# Patient Record
Sex: FEMALE | Race: WHITE | Employment: FULL TIME | URBAN - NONMETROPOLITAN AREA
[De-identification: names, ages, dates, MRNs, and addresses within clinical notes are randomized per-mention and may not be internally consistent; named-entity substitution may affect disease eponyms.]

---

## 2016-02-11 LAB
BUN BLDV-MCNC: 20 MG/DL
CALCIUM SERPL-MCNC: 9.7 MG/DL
CHLORIDE BLD-SCNC: 107 MMOL/L
CHOLESTEROL, TOTAL: 186 MG/DL
CHOLESTEROL/HDL RATIO: 2.6
CO2: 29 MMOL/L
CREAT SERPL-MCNC: 0.7 MG/DL
GFR CALCULATED: NORMAL
GLUCOSE BLD-MCNC: 91 MG/DL
HDLC SERPL-MCNC: 72 MG/DL (ref 35–70)
LDL CHOLESTEROL CALCULATED: 104.4 MG/DL (ref 0–160)
POTASSIUM SERPL-SCNC: 4.4 MMOL/L
SODIUM BLD-SCNC: 146 MMOL/L
TRIGL SERPL-MCNC: 48 MG/DL
VLDLC SERPL CALC-MCNC: 10 MG/DL

## 2017-06-02 ENCOUNTER — OFFICE VISIT (OUTPATIENT)
Dept: FAMILY MEDICINE CLINIC | Age: 67
End: 2017-06-02
Payer: COMMERCIAL

## 2017-06-02 VITALS
HEIGHT: 70 IN | SYSTOLIC BLOOD PRESSURE: 124 MMHG | WEIGHT: 142 LBS | DIASTOLIC BLOOD PRESSURE: 82 MMHG | BODY MASS INDEX: 20.33 KG/M2 | HEART RATE: 84 BPM

## 2017-06-02 VITALS
HEIGHT: 69 IN | BODY MASS INDEX: 21.18 KG/M2 | SYSTOLIC BLOOD PRESSURE: 122 MMHG | WEIGHT: 143 LBS | DIASTOLIC BLOOD PRESSURE: 76 MMHG

## 2017-06-02 DIAGNOSIS — N60.19 FIBROCYSTIC BREAST DISEASE, UNSPECIFIED LATERALITY: ICD-10-CM

## 2017-06-02 DIAGNOSIS — N63.20 BREAST MASS, LEFT: ICD-10-CM

## 2017-06-02 DIAGNOSIS — R55 NEUROCARDIOGENIC SYNCOPE: ICD-10-CM

## 2017-06-02 DIAGNOSIS — Z80.3 FAMILY HISTORY OF BREAST CANCER: Primary | ICD-10-CM

## 2017-06-02 DIAGNOSIS — Z80.3 FAMILY HISTORY OF BREAST CANCER: ICD-10-CM

## 2017-06-02 PROCEDURE — 1036F TOBACCO NON-USER: CPT | Performed by: FAMILY MEDICINE

## 2017-06-02 PROCEDURE — 99213 OFFICE O/P EST LOW 20 MIN: CPT | Performed by: FAMILY MEDICINE

## 2017-06-02 PROCEDURE — G8399 PT W/DXA RESULTS DOCUMENT: HCPCS | Performed by: FAMILY MEDICINE

## 2017-06-02 PROCEDURE — 1090F PRES/ABSN URINE INCON ASSESS: CPT | Performed by: FAMILY MEDICINE

## 2017-06-02 PROCEDURE — 1123F ACP DISCUSS/DSCN MKR DOCD: CPT | Performed by: FAMILY MEDICINE

## 2017-06-02 PROCEDURE — 3014F SCREEN MAMMO DOC REV: CPT | Performed by: FAMILY MEDICINE

## 2017-06-02 PROCEDURE — G8427 DOCREV CUR MEDS BY ELIG CLIN: HCPCS | Performed by: FAMILY MEDICINE

## 2017-06-02 PROCEDURE — G8419 CALC BMI OUT NRM PARAM NOF/U: HCPCS | Performed by: FAMILY MEDICINE

## 2017-06-02 PROCEDURE — 3017F COLORECTAL CA SCREEN DOC REV: CPT | Performed by: FAMILY MEDICINE

## 2017-06-02 PROCEDURE — 4040F PNEUMOC VAC/ADMIN/RCVD: CPT | Performed by: FAMILY MEDICINE

## 2017-06-02 ASSESSMENT — PATIENT HEALTH QUESTIONNAIRE - PHQ9
2. FEELING DOWN, DEPRESSED OR HOPELESS: 0
SUM OF ALL RESPONSES TO PHQ9 QUESTIONS 1 & 2: 0
1. LITTLE INTEREST OR PLEASURE IN DOING THINGS: 0
SUM OF ALL RESPONSES TO PHQ QUESTIONS 1-9: 0

## 2017-06-02 ASSESSMENT — ENCOUNTER SYMPTOMS
SHORTNESS OF BREATH: 0
COUGH: 0

## 2017-06-06 DIAGNOSIS — N63.20 BREAST MASS, LEFT: ICD-10-CM

## 2017-10-16 ENCOUNTER — TELEPHONE (OUTPATIENT)
Dept: FAMILY MEDICINE CLINIC | Age: 67
End: 2017-10-16

## 2017-10-16 DIAGNOSIS — Z12.31 SCREENING MAMMOGRAM, ENCOUNTER FOR: Primary | ICD-10-CM

## 2017-11-22 ENCOUNTER — OFFICE VISIT (OUTPATIENT)
Dept: FAMILY MEDICINE CLINIC | Age: 67
End: 2017-11-22
Payer: COMMERCIAL

## 2017-11-22 VITALS
BODY MASS INDEX: 20.52 KG/M2 | DIASTOLIC BLOOD PRESSURE: 80 MMHG | HEART RATE: 60 BPM | WEIGHT: 143 LBS | SYSTOLIC BLOOD PRESSURE: 132 MMHG

## 2017-11-22 DIAGNOSIS — Z13.71 BRCA NEGATIVE: ICD-10-CM

## 2017-11-22 DIAGNOSIS — Z13.1 SCREENING FOR DIABETES MELLITUS: ICD-10-CM

## 2017-11-22 DIAGNOSIS — Z00.00 EXAMINATION, MEDICAL, GENERAL: Primary | ICD-10-CM

## 2017-11-22 DIAGNOSIS — Z80.3 FAMILY HISTORY OF BREAST CANCER: ICD-10-CM

## 2017-11-22 DIAGNOSIS — N60.19 FIBROCYSTIC BREAST DISEASE (FCBD), UNSPECIFIED LATERALITY: ICD-10-CM

## 2017-11-22 DIAGNOSIS — Z13.220 SCREENING, LIPID: ICD-10-CM

## 2017-11-22 DIAGNOSIS — Z11.59 NEED FOR HEPATITIS C SCREENING TEST: ICD-10-CM

## 2017-11-22 DIAGNOSIS — Z23 NEED FOR VACCINATION WITH 13-POLYVALENT PNEUMOCOCCAL CONJUGATE VACCINE: ICD-10-CM

## 2017-11-22 PROCEDURE — 99397 PER PM REEVAL EST PAT 65+ YR: CPT | Performed by: FAMILY MEDICINE

## 2017-11-22 PROCEDURE — 90471 IMMUNIZATION ADMIN: CPT | Performed by: FAMILY MEDICINE

## 2017-11-22 PROCEDURE — 90670 PCV13 VACCINE IM: CPT | Performed by: FAMILY MEDICINE

## 2017-11-22 ASSESSMENT — ENCOUNTER SYMPTOMS
SHORTNESS OF BREATH: 0
SORE THROAT: 0
BLOOD IN STOOL: 0
ABDOMINAL PAIN: 0
DIARRHEA: 0
CONSTIPATION: 0
WHEEZING: 0

## 2017-11-22 NOTE — PROGRESS NOTES
After obtaining consent, and per orders of Dr. Tonie Colon , injection of Prevnar 13 given in Left deltoid by Servando Moy. Patient instructed to remain in clinic for 20 minutes afterwards, and to report any adverse reaction to me immediately.

## 2017-11-22 NOTE — PROGRESS NOTES
Reactions    Amoxicillin     Clindamycin/Lincomycin        Health Maintenance   Topic Date Due    Hepatitis C screen  1950    Cervical cancer screen  01/08/1973    Colon cancer screen colonoscopy  01/08/2002    Zostavax vaccine  01/08/2012    Flu vaccine (1) 09/01/2017    HIV screen  11/22/2027 (Originally 1/8/1967)    Breast cancer screen  10/20/2018    Pneumococcal low/med risk (2 of 2 - PPSV23) 11/22/2018    Lipid screen  02/11/2021    DTaP/Tdap/Td vaccine (2 - Td) 11/24/2022    DEXA (modify frequency per FRAX score)  Completed       Subjective:      Review of Systems   Constitutional: Negative for appetite change, chills and fever. HENT: Negative for sore throat. Respiratory: Negative for shortness of breath and wheezing. Cardiovascular: Negative for chest pain, palpitations and leg swelling. Gastrointestinal: Negative for abdominal pain, blood in stool, constipation and diarrhea. Genitourinary: Negative for dysuria, hematuria and urgency. Hematological: Negative for adenopathy. Objective:     /80   Pulse 60   Wt 143 lb (64.9 kg)   BMI 20.52 kg/m²     Physical Exam   Constitutional: She appears well-developed and well-nourished. congested   HENT:   Head: Normocephalic. Right Ear: Tympanic membrane normal.   Left Ear: Tympanic membrane normal.   Nose: Nose normal.   Mouth/Throat: No oropharyngeal exudate or posterior oropharyngeal erythema. Neck: Neck supple. Carotid bruit is not present. No thyromegaly present. Cardiovascular: Normal rate, regular rhythm, S1 normal and S2 normal.    No murmur heard. Pulmonary/Chest: Breath sounds normal. She has no wheezes. She has no rhonchi. She has no rales. She exhibits no tenderness. Breast exam deferred ; done in June    Abdominal: Soft. There is no hepatosplenomegaly. There is no tenderness. Musculoskeletal: She exhibits no edema.         Thoracic back: Normal. She exhibits normal range of motion, no tenderness, no bony tenderness and no spasm. Lumbar back: She exhibits normal range of motion, no tenderness and no bony tenderness. Full flexion /extension of spine    Lymphadenopathy:     She has no cervical adenopathy. She has no axillary adenopathy. Neurological: No cranial nerve deficit. Vitals reviewed. Assessment:     1. Need for hepatitis C screening test     2. Family history of breast cancer     3. Fibrocystic breast disease (FCBD), unspecified laterality     4. Neurocardiogenic syncope     5. BRCA negative     6. Need for vaccination with 13-polyvalent pneumococcal conjugate vaccine  Pneumococcal conjugate vaccine 13-valent IM (PREVNAR 13)   7. Examination, medical, general  Comprehensive Metabolic Panel, Fasting    CBC Auto Differential   8. Screening, lipid  Lipid Panel   9. Screening for diabetes mellitus  Comprehensive Metabolic Panel, Fasting            POC Testing Results (If Applicable):  No results found for this visit on 11/22/17. Plan:     Reviewed last lipid profile   And ASCVD risk calculator  Repeat yearly labs; update immunizations  Reassured in regards to her pains     Orders Given:  Orders Placed This Encounter   Procedures    Pneumococcal conjugate vaccine 13-valent IM (PREVNAR 13)    Lipid Panel     Standing Status:   Future     Standing Expiration Date:   11/22/2018     Order Specific Question:   Is Patient Fasting?/# of Hours     Answer:   Yes, 12 hours    Comprehensive Metabolic Panel, Fasting     Standing Status:   Future     Standing Expiration Date:   11/22/2018    CBC Auto Differential     Standing Status:   Future     Standing Expiration Date:   11/22/2018     Prescriptions:    No orders of the defined types were placed in this encounter. Return in about 1 year (around 11/22/2018). Electronically signed by Kayla Waters MD on 11/25/2017.

## 2018-01-08 LAB
A/G RATIO: 1.5 RATIO
AGE FOR GFR: 66
ALBUMIN: 4.1 G/DL
ALK PHOSPHATASE: 84 UNITS/L
ALT SERPL-CCNC: 30 UNITS/L
ANION GAP SERPL CALCULATED.3IONS-SCNC: 13 MMOL/L
AST SERPL-CCNC: 41 UNITS/L
BASOPHILS # BLD: 0.08 THOU/MM3
BILIRUB SERPL-MCNC: 0.8 MG/DL
BUN BLDV-MCNC: 19 MG/DL
CALCIUM SERPL-MCNC: 9.5 MG/DL
CHLORIDE BLD-SCNC: 105 MMOL/L
CHOLESTEROL/HDL RATIO: 2.3 RATIO
CHOLESTEROL: 174 MG/DL
CO2: 29 MMOL/L
CREAT SERPL-MCNC: 0.7 MG/DL
DIFFERENTIAL: AUTOMATED DIFF
EGFR BF: 101 ML/MIN/1.73 M2
EGFR BM: 137 ML/MIN/1.73 M2
EGFR WF: 84 ML/MIN/1.73 M2
EGFR WM: 113 ML/MIN/1.73 M2
EOSINOPHIL # BLD: 0.4 THOU/MM3
GLOBULIN: 2.8 G/DL
GLUCOSE: 88 MG/DL
HCT VFR BLD CALC: 40.7 %
HDL, DIRECT: 75 MG/DL
HEMOGLOBIN: 13.6 G/DL
LDL CHOLESTEROL CALCULATED: 84.4 MG/DL
LYMPHOCYTES # BLD: 1.06 THOU/MM3
MCH RBC QN AUTO: 30.7 PG
MCHC RBC AUTO-ENTMCNC: 33.4 G/DL
MCV RBC AUTO: 91.9 FL
MONOCYTES # BLD: 0.41 THOU/MM3
NEUTROPHILS: 3.34 THOU/MM3
PDW BLD-RTO: 12.7 %
PLATELET # BLD: 209 THOU/MM3
PMV BLD AUTO: 8.3 FL
POTASSIUM SERPL-SCNC: 4.2 MMOL/L
RBC # BLD: 4.43 M/UL
SODIUM BLD-SCNC: 143 MMOL/L
TOTAL PROTEIN: 6.9 G/DL
TRIGL SERPL-MCNC: 73 MG/DL
VLDLC SERPL CALC-MCNC: 15 MG/DL
WBC # BLD: 5.27 THOU/ML3

## 2018-10-04 DIAGNOSIS — Z12.31 ENCOUNTER FOR SCREENING MAMMOGRAM FOR MALIGNANT NEOPLASM OF BREAST: Primary | ICD-10-CM

## 2018-11-27 ENCOUNTER — OFFICE VISIT (OUTPATIENT)
Dept: FAMILY MEDICINE CLINIC | Age: 68
End: 2018-11-27
Payer: MEDICARE

## 2018-11-27 VITALS
HEART RATE: 85 BPM | OXYGEN SATURATION: 98 % | HEIGHT: 70 IN | WEIGHT: 142 LBS | DIASTOLIC BLOOD PRESSURE: 82 MMHG | SYSTOLIC BLOOD PRESSURE: 130 MMHG | BODY MASS INDEX: 20.33 KG/M2

## 2018-11-27 DIAGNOSIS — Z80.3 FAMILY HISTORY OF BREAST CANCER: ICD-10-CM

## 2018-11-27 DIAGNOSIS — N60.19 FIBROCYSTIC BREAST DISEASE (FCBD), UNSPECIFIED LATERALITY: ICD-10-CM

## 2018-11-27 DIAGNOSIS — Z78.0 ASYMPTOMATIC POSTMENOPAUSAL STATE: ICD-10-CM

## 2018-11-27 DIAGNOSIS — Z23 NEED FOR 23-POLYVALENT PNEUMOCOCCAL POLYSACCHARIDE VACCINE: ICD-10-CM

## 2018-11-27 DIAGNOSIS — Z12.11 ENCOUNTER FOR SCREENING COLONOSCOPY: ICD-10-CM

## 2018-11-27 DIAGNOSIS — Z12.31 SCREENING MAMMOGRAM, ENCOUNTER FOR: ICD-10-CM

## 2018-11-27 DIAGNOSIS — Z23 NEED FOR PROPHYLACTIC VACCINATION AND INOCULATION AGAINST INFLUENZA: Primary | ICD-10-CM

## 2018-11-27 DIAGNOSIS — Z23 NEED FOR PROPHYLACTIC VACCINATION AGAINST STREPTOCOCCUS PNEUMONIAE (PNEUMOCOCCUS): ICD-10-CM

## 2018-11-27 DIAGNOSIS — Z11.59 NEED FOR HEPATITIS C SCREENING TEST: ICD-10-CM

## 2018-11-27 DIAGNOSIS — Z00.00 ROUTINE GENERAL MEDICAL EXAMINATION AT A HEALTH CARE FACILITY: ICD-10-CM

## 2018-11-27 LAB
HEPATITIS C IGG: NORMAL
SIGNAL/CUTOFF: NORMAL

## 2018-11-27 PROCEDURE — 4040F PNEUMOC VAC/ADMIN/RCVD: CPT | Performed by: FAMILY MEDICINE

## 2018-11-27 PROCEDURE — G0009 ADMIN PNEUMOCOCCAL VACCINE: HCPCS | Performed by: FAMILY MEDICINE

## 2018-11-27 PROCEDURE — 90732 PPSV23 VACC 2 YRS+ SUBQ/IM: CPT | Performed by: FAMILY MEDICINE

## 2018-11-27 PROCEDURE — 90662 IIV NO PRSV INCREASED AG IM: CPT | Performed by: FAMILY MEDICINE

## 2018-11-27 PROCEDURE — G0008 ADMIN INFLUENZA VIRUS VAC: HCPCS | Performed by: FAMILY MEDICINE

## 2018-11-27 PROCEDURE — G0402 INITIAL PREVENTIVE EXAM: HCPCS | Performed by: FAMILY MEDICINE

## 2018-11-27 ASSESSMENT — LIFESTYLE VARIABLES
HAS A RELATIVE, FRIEND, DOCTOR, OR ANOTHER HEALTH PROFESSIONAL EXPRESSED CONCERN ABOUT YOUR DRINKING OR SUGGESTED YOU CUT DOWN: 0
HOW OFTEN DO YOU HAVE A DRINK CONTAINING ALCOHOL: 2
AUDIT TOTAL SCORE: 2
HOW OFTEN DO YOU HAVE SIX OR MORE DRINKS ON ONE OCCASION: 0
HOW MANY STANDARD DRINKS CONTAINING ALCOHOL DO YOU HAVE ON A TYPICAL DAY: 0
AUDIT-C TOTAL SCORE: 2
HOW OFTEN DURING THE LAST YEAR HAVE YOU FAILED TO DO WHAT WAS NORMALLY EXPECTED FROM YOU BECAUSE OF DRINKING: 0
HOW OFTEN DURING THE LAST YEAR HAVE YOU NEEDED AN ALCOHOLIC DRINK FIRST THING IN THE MORNING TO GET YOURSELF GOING AFTER A NIGHT OF HEAVY DRINKING: 0
HAVE YOU OR SOMEONE ELSE BEEN INJURED AS A RESULT OF YOUR DRINKING: 0
HOW OFTEN DURING THE LAST YEAR HAVE YOU FOUND THAT YOU WERE NOT ABLE TO STOP DRINKING ONCE YOU HAD STARTED: 0
HOW OFTEN DURING THE LAST YEAR HAVE YOU HAD A FEELING OF GUILT OR REMORSE AFTER DRINKING: 0
HOW OFTEN DURING THE LAST YEAR HAVE YOU BEEN UNABLE TO REMEMBER WHAT HAPPENED THE NIGHT BEFORE BECAUSE YOU HAD BEEN DRINKING: 0

## 2018-11-27 ASSESSMENT — PATIENT HEALTH QUESTIONNAIRE - PHQ9
SUM OF ALL RESPONSES TO PHQ QUESTIONS 1-9: 0
SUM OF ALL RESPONSES TO PHQ QUESTIONS 1-9: 0

## 2018-11-27 ASSESSMENT — ANXIETY QUESTIONNAIRES: GAD7 TOTAL SCORE: 1

## 2018-11-29 ENCOUNTER — OFFICE VISIT (OUTPATIENT)
Dept: SURGERY | Age: 68
End: 2018-11-29
Payer: MEDICARE

## 2018-11-29 VITALS
TEMPERATURE: 98.9 F | BODY MASS INDEX: 20.37 KG/M2 | HEART RATE: 74 BPM | SYSTOLIC BLOOD PRESSURE: 139 MMHG | HEIGHT: 70 IN | DIASTOLIC BLOOD PRESSURE: 70 MMHG

## 2018-11-29 DIAGNOSIS — Z12.11 ENCOUNTER FOR SCREENING COLONOSCOPY: Primary | ICD-10-CM

## 2018-11-29 DIAGNOSIS — Z11.59 NEED FOR HEPATITIS C SCREENING TEST: ICD-10-CM

## 2018-11-29 LAB — ANTIBODY: NORMAL

## 2018-11-29 PROCEDURE — G8427 DOCREV CUR MEDS BY ELIG CLIN: HCPCS | Performed by: SURGERY

## 2018-11-29 PROCEDURE — 1101F PT FALLS ASSESS-DOCD LE1/YR: CPT | Performed by: SURGERY

## 2018-11-29 PROCEDURE — G8482 FLU IMMUNIZE ORDER/ADMIN: HCPCS | Performed by: SURGERY

## 2018-11-29 PROCEDURE — 99204 OFFICE O/P NEW MOD 45 MIN: CPT | Performed by: SURGERY

## 2018-11-29 PROCEDURE — 1123F ACP DISCUSS/DSCN MKR DOCD: CPT | Performed by: SURGERY

## 2018-11-29 PROCEDURE — 1090F PRES/ABSN URINE INCON ASSESS: CPT | Performed by: SURGERY

## 2018-11-29 PROCEDURE — G8420 CALC BMI NORM PARAMETERS: HCPCS | Performed by: SURGERY

## 2018-11-29 PROCEDURE — 3017F COLORECTAL CA SCREEN DOC REV: CPT | Performed by: SURGERY

## 2018-11-29 PROCEDURE — 4040F PNEUMOC VAC/ADMIN/RCVD: CPT | Performed by: SURGERY

## 2018-11-29 PROCEDURE — G8399 PT W/DXA RESULTS DOCUMENT: HCPCS | Performed by: SURGERY

## 2018-11-29 PROCEDURE — 1036F TOBACCO NON-USER: CPT | Performed by: SURGERY

## 2018-11-29 NOTE — LETTER
[address] [facility] [address]  Phone: [phone]  Fax: [phone]    Eli Stephenson MD        November 29, 2018       Patient: Makayla Jones   MR Number: [id number]   YOB: 1950   Date of Visit: 11/29/2018       Dear Dr. Kee Galdamez: Thank you for the request for consultation for Tony Balderrama to me for the evaluation of screening colonoscopy. Below are the relevant portions of my assessment and plan of care. If you have questions, please do not hesitate to call me. I look forward to following [place] along with you. Sincerely,        Delaney Pan, DO    CC providers:  Sloan Green MD  [address]

## 2018-12-03 DIAGNOSIS — Z78.0 ASYMPTOMATIC POSTMENOPAUSAL STATE: ICD-10-CM

## 2019-06-11 ENCOUNTER — OFFICE VISIT (OUTPATIENT)
Dept: FAMILY MEDICINE CLINIC | Age: 69
End: 2019-06-11
Payer: MEDICARE

## 2019-06-11 VITALS
WEIGHT: 140 LBS | OXYGEN SATURATION: 98 % | DIASTOLIC BLOOD PRESSURE: 72 MMHG | HEART RATE: 89 BPM | BODY MASS INDEX: 20.09 KG/M2 | SYSTOLIC BLOOD PRESSURE: 128 MMHG

## 2019-06-11 DIAGNOSIS — I49.9 IRREGULAR HEARTBEAT: Primary | ICD-10-CM

## 2019-06-11 DIAGNOSIS — R55 NEAR SYNCOPE: ICD-10-CM

## 2019-06-11 PROCEDURE — 1090F PRES/ABSN URINE INCON ASSESS: CPT | Performed by: FAMILY MEDICINE

## 2019-06-11 PROCEDURE — G8399 PT W/DXA RESULTS DOCUMENT: HCPCS | Performed by: FAMILY MEDICINE

## 2019-06-11 PROCEDURE — G8420 CALC BMI NORM PARAMETERS: HCPCS | Performed by: FAMILY MEDICINE

## 2019-06-11 PROCEDURE — 1036F TOBACCO NON-USER: CPT | Performed by: FAMILY MEDICINE

## 2019-06-11 PROCEDURE — 3017F COLORECTAL CA SCREEN DOC REV: CPT | Performed by: FAMILY MEDICINE

## 2019-06-11 PROCEDURE — 1123F ACP DISCUSS/DSCN MKR DOCD: CPT | Performed by: FAMILY MEDICINE

## 2019-06-11 PROCEDURE — 99214 OFFICE O/P EST MOD 30 MIN: CPT | Performed by: FAMILY MEDICINE

## 2019-06-11 PROCEDURE — 4040F PNEUMOC VAC/ADMIN/RCVD: CPT | Performed by: FAMILY MEDICINE

## 2019-06-11 PROCEDURE — G8427 DOCREV CUR MEDS BY ELIG CLIN: HCPCS | Performed by: FAMILY MEDICINE

## 2019-06-11 ASSESSMENT — PATIENT HEALTH QUESTIONNAIRE - PHQ9
2. FEELING DOWN, DEPRESSED OR HOPELESS: 0
SUM OF ALL RESPONSES TO PHQ QUESTIONS 1-9: 0
1. LITTLE INTEREST OR PLEASURE IN DOING THINGS: 0
SUM OF ALL RESPONSES TO PHQ9 QUESTIONS 1 & 2: 0
SUM OF ALL RESPONSES TO PHQ QUESTIONS 1-9: 0

## 2019-06-11 ASSESSMENT — ENCOUNTER SYMPTOMS
SHORTNESS OF BREATH: 0
COUGH: 0
WHEEZING: 0
GASTROINTESTINAL NEGATIVE: 1
CHEST TIGHTNESS: 1

## 2019-06-11 NOTE — PROGRESS NOTES
[address] [facility]  [address]  Dept: [phone]  DeptFax: [phone]    Luca Gutierrez is a79 y.o. female who presents today for her medical conditions/complaints as noted below. Luca Gutierrez is c/o of Dizziness (pt reports for past week feels like she is going to pass out, says last week was in [place] shopping and had to leave, says her shoulder hurts as well. says has some palpatations, does not pass out just has a feeling like wants to )      HPI:     HPI   Patient comes in with symptoms of ultrasound in the near syncope and palpitations. Sometimes a heaviness in her chest.  This is been going on for a few weeks. Really noticeable last week when she was visiting [place] and shopping. She had to go outside and sit down. She felt \"heavy \"like she might faint or pass out. She did not have any palpitations or fluttering her chest at that time. Chest felt heavy but she does not have any shortness of breath or dyspnea on exertion. She actually feels well when she walks. Sinking feeling palpitations and heaviness in her chest do not all occur at the same time. Sunday, 2 days prior she had the same heavy feeling in her chest when she was seated in Religious. Had to get up and move around. She wonders sometimes if this is not some anxiety. Sometimes she feels anxious. But there is no precipitating factor. Nothing like crowds or driving. Though she can have palpitations without associated symptoms. She can have heaviness in her chest without associated symptoms. She can never sinking feeling without either heaviness or palpitations. She does not have any sensation of warmth or flushing with these episodes. He also wonders about her neck. Her right neck has been painful sore again around the same time that the symptoms did. But she was outside doing some work on [employer] which included lifting and cleaning.   And her discomfort began then. She also bothered by the fact that both her mother and father suffered early coronary artery disease. Review her past medical records show that she did have a tilt table test which was positive. She knows that she goes back to 2010. She was never on any medications for neurocardiogenic syncope. Was simply told that she should increase her salt intake. She was never on Florinef. She was never on an SSRI. She does not think it those spells felt the same as this    . BP Readings from Last 3 Encounters:   06/11/19 128/72   11/29/18 139/70   11/27/18 130/82            (goal 120/80)    Past Medical History:   Diagnosis Date    BRCA negative     Fibrocystic breast disease     Neurocardiogenic syncope       Past Surgical History:   Procedure Laterality Date   [address]- right; 2006- left; 2011- left     COLONOSCOPY      COLONOSCOPY  12/14/2018    [facility]       Family History   Problem Relation Age of Onset    Coronary Art Dis Mother     Breast Cancer Mother     Coronary Art Dis Father     High Blood Pressure Father     High Cholesterol Father      Social History     Tobacco Use    Smoking status: Never Smoker    Smokeless tobacco: Never Used   Substance Use Topics    Alcohol use: Yes        Current Outpatient Medications   Medication Sig Dispense Refill    Multiple Vitamins-Minerals (MULTIVITAMIN & MINERAL PO) Take by mouth       No current facility-administered medications for this visit.       Allergies   Allergen Reactions    Amoxicillin     Clindamycin/Lincomycin        Health Maintenance   Topic Date Due    Shingles Vaccine (1 of 2) 01/08/2002    Breast cancer screen  10/22/2019    DTaP/Tdap/Td vaccine (2 - Td) 11/24/2022    Lipid screen  01/08/2023    Colon cancer screen colonoscopy  12/14/2028    DEXA (modify frequency per FRAX score)  Completed    Flu vaccine  Completed    Pneumococcal 65+ years Vaccine  Completed  Hepatitis C screen  Completed       Lab Results   Component Value Date    K 4.2 01/08/2018    CREATININE 0.7 01/08/2018    AST 41 01/08/2018    ALT 30 01/08/2018    HCT 40.7 01/08/2018    GLUCOSE 88 01/08/2018    CALCIUM 9.5 01/08/2018      Lab Results   Component Value Date    CHOL 174 01/08/2018    CHOL 186 02/11/2016    TRIG 73 01/08/2018    HDL 72 02/11/2016       Subjective:      Review of Systems   Constitutional: Negative for activity change, appetite change, fatigue, fever and unexpected weight change. HENT: Negative. Respiratory: Positive for chest tightness. Negative for cough, shortness of breath and wheezing. Cardiovascular: Positive for palpitations. Negative for chest pain and leg swelling. Gastrointestinal: Negative. Genitourinary: Negative. Musculoskeletal: Positive for neck pain and neck stiffness. Skin: Negative. Psychiatric/Behavioral: The patient is nervous/anxious. Objective:     /72 (Site: Left Upper Arm, Position: Standing)   Pulse 89   Wt 140 lb (63.5 kg)   SpO2 98%   BMI 20.09 kg/m²     Physical Exam   Constitutional: She appears well-developed and well-nourished. congested   HENT:   Head: Normocephalic. Right Ear: Tympanic membrane normal.   Left Ear: Tympanic membrane normal.   Nose: Nose normal.   Mouth/Throat: No oropharyngeal exudate or posterior oropharyngeal erythema. Neck: Neck supple. Carotid bruit is not present. No thyromegaly present. Cardiovascular: Normal rate, regular rhythm, S1 normal and S2 normal.   No murmur heard. Pulmonary/Chest: Breath sounds normal. She has no decreased breath sounds. She has no wheezes. She has no rhonchi. She has no rales. She exhibits no tenderness. Abdominal: Soft. There is no hepatosplenomegaly. There is no tenderness. Musculoskeletal: She exhibits no edema. Lymphadenopathy:     She has no cervical adenopathy. She has no axillary adenopathy. Neurological: No cranial nerve deficit. Vitals reviewed. Assessment:      Diagnosis Orders   1. Irregular heartbeat  Holter Monitor 48 Hour    Comprehensive Metabolic Panel, Fasting    CBC Auto Differential    TSH with Reflex    Magnesium   2. Near syncope  Tilt Table Test    Comprehensive Metabolic Panel, Fasting    CBC Auto Differential    TSH with Reflex    Magnesium            POC Testing Results (If Applicable):  No results found for this visit on 06/11/19. Plan:     CMP CBC TSH. Holter monitor x48 hours  Tilt table testing. Check in 2 weeks    Orders Given:  Orders Placed This Encounter   Procedures    Comprehensive Metabolic Panel, Fasting     Standing Status:   Future     Standing Expiration Date:   6/10/2020    CBC Auto Differential     Standing Status:   Future     Standing Expiration Date:   6/10/2020    TSH with Reflex     Standing Status:   Future     Standing Expiration Date:   6/10/2020    Magnesium     Standing Status:   Future     Standing Expiration Date:   6/10/2020    Holter Monitor 48 Hour     Standing Status:   Future     Standing Expiration Date:   6/11/2020     Order Specific Question:   Reason for Exam?     Answer:   Irregular heart rate    Tilt Table Test     Prescriptions:    No orders of the defined types were placed in this encounter. Return in about 2 weeks (around 6/25/2019). Electronically signed by Gen Portillo MD on6/11/2019. **This report has been created using voice recognition software. It may contain minor errors which are inherent in voice recognition technology. **

## 2019-06-14 LAB
A/G RATIO: 1.5 RATIO
AGE FOR GFR: 67
ALBUMIN: 4.1 G/DL (ref 3.5–5)
ALK PHOSPHATASE: 70 UNITS/L (ref 38–126)
ALT SERPL-CCNC: 19 UNITS/L (ref 9–52)
ANION GAP SERPL CALCULATED.3IONS-SCNC: 9 MMOL/L
AST SERPL-CCNC: 41 UNITS/L (ref 14–36)
BANDS: 0 % (ref 0–5)
BASOPHILS # BLD: 0 % (ref 0–1)
BILIRUB SERPL-MCNC: 0.6 MG/DL (ref 0.2–1.3)
BUN BLDV-MCNC: 16 MG/DL (ref 7–17)
CALCIUM SERPL-MCNC: 9.4 MG/DL (ref 8.4–10.2)
CHLORIDE BLD-SCNC: 105 MMOL/L (ref 98–120)
CO2: 30 MMOL/L (ref 22–31)
CREAT SERPL-MCNC: 0.7 MG/DL (ref 0.5–1)
DIFFERENTIAL: MANUAL DIFF
EGFR BF: 101 ML/MIN/1.73 M2
EGFR BM: 136 ML/MIN/1.73 M2
EGFR WF: 83 ML/MIN/1.73 M2
EGFR WM: 112 ML/MIN/1.73 M2
EOSINOPHIL # BLD: 13 % (ref 0–10)
GLOBULIN: 2.7 G/DL
GLUCOSE: 82 MG/DL (ref 65–105)
HCT VFR BLD CALC: 38.8 % (ref 37–47)
HEMOGLOBIN: 12.9 G/DL (ref 12–16)
LYMPHOCYTES # BLD: 25 % (ref 21–51)
MAGNESIUM: 2.1 MG/DL (ref 1.6–2.3)
MCH RBC QN AUTO: 30.3 PG (ref 28.5–32)
MCHC RBC AUTO-ENTMCNC: 33.2 G/DL (ref 32–37)
MCV RBC AUTO: 91.3 FL (ref 80–94)
MONOCYTES: 7 % (ref 2–9)
MORPHOLOGY: NORMAL
NEUTROPHILS: 55 % (ref 42–75)
PDW BLD-RTO: 12.5 % (ref 8.5–15.5)
PLATELET # BLD: 195 THOU/MM3 (ref 130–400)
PMV BLD AUTO: 8.8 FL (ref 7.4–11)
POTASSIUM SERPL-SCNC: 4.1 MMOL/L (ref 3.6–5)
RBC # BLD: 4.25 M/UL (ref 4.2–5.4)
SODIUM BLD-SCNC: 140 MMOL/L (ref 135–145)
TOTAL PROTEIN: 6.8 G/DL (ref 6.3–8.2)
TSH REFLEX FT4: 2.04 MIU/ML (ref 0.49–4.6)
WBC # BLD: 4.58 THOU/ML3 (ref 4.8–10)

## 2019-06-26 ENCOUNTER — OFFICE VISIT (OUTPATIENT)
Dept: FAMILY MEDICINE CLINIC | Age: 69
End: 2019-06-26
Payer: MEDICARE

## 2019-06-26 VITALS
HEART RATE: 86 BPM | OXYGEN SATURATION: 98 % | BODY MASS INDEX: 20.09 KG/M2 | WEIGHT: 140 LBS | DIASTOLIC BLOOD PRESSURE: 84 MMHG | SYSTOLIC BLOOD PRESSURE: 134 MMHG

## 2019-06-26 DIAGNOSIS — R55 NEUROCARDIOGENIC SYNCOPE: Primary | ICD-10-CM

## 2019-06-26 PROCEDURE — 99213 OFFICE O/P EST LOW 20 MIN: CPT | Performed by: FAMILY MEDICINE

## 2019-06-26 PROCEDURE — 3017F COLORECTAL CA SCREEN DOC REV: CPT | Performed by: FAMILY MEDICINE

## 2019-06-26 PROCEDURE — 1123F ACP DISCUSS/DSCN MKR DOCD: CPT | Performed by: FAMILY MEDICINE

## 2019-06-26 PROCEDURE — G8420 CALC BMI NORM PARAMETERS: HCPCS | Performed by: FAMILY MEDICINE

## 2019-06-26 PROCEDURE — 4040F PNEUMOC VAC/ADMIN/RCVD: CPT | Performed by: FAMILY MEDICINE

## 2019-06-26 PROCEDURE — G8399 PT W/DXA RESULTS DOCUMENT: HCPCS | Performed by: FAMILY MEDICINE

## 2019-06-26 PROCEDURE — 1090F PRES/ABSN URINE INCON ASSESS: CPT | Performed by: FAMILY MEDICINE

## 2019-06-26 PROCEDURE — G8427 DOCREV CUR MEDS BY ELIG CLIN: HCPCS | Performed by: FAMILY MEDICINE

## 2019-06-26 PROCEDURE — 1036F TOBACCO NON-USER: CPT | Performed by: FAMILY MEDICINE

## 2019-06-26 ASSESSMENT — ENCOUNTER SYMPTOMS
COUGH: 0
SORE THROAT: 0
GASTROINTESTINAL NEGATIVE: 1
SINUS PAIN: 0
SHORTNESS OF BREATH: 0
WHEEZING: 0
RHINORRHEA: 0

## 2019-06-26 NOTE — PROGRESS NOTES
[facility]  [address]  Dept: [phone]  DeptFax: [phone]    Bel Multani is a79 y.o. female who presents today for her medical conditions/complaints as noted below. Bel Multani is c/o of 2 Week Follow-Up (review results of tilt table test)      HPI:     HPI    Patient follows up after near syncopal episode. Since then she is undergone tilt table testing is noted here below. Was in fact positive. 48-hour Holter monitor is pending. Remainder of her blood work was normal.  She has been asymptomatic in the interim. Other than the fact that she has had upper airway congestion and cold for the past couple weeks. Getting better. No fevers or chills. No cough. BP Readings from Last 3 Encounters:   06/26/19 134/84   06/11/19 128/72   11/29/18 139/70            (goal 120/80)    Past Medical History:   Diagnosis Date    BRCA negative     Fibrocystic breast disease     Neurocardiogenic syncope       Past Surgical History:   Procedure Laterality Date   [address]- right; 2006- left; 2011- left     CATARACT REMOVAL Bilateral     COLONOSCOPY      COLONOSCOPY  12/14/2018    [facility] Chey Zurita at [facility] History   Problem Relation Age of Onset    Coronary Art Dis Mother     Breast Cancer Mother     Coronary Art Dis Father     High Blood Pressure Father     High Cholesterol Father      Social History     Tobacco Use    Smoking status: Never Smoker    Smokeless tobacco: Never Used   Substance Use Topics    Alcohol use: Yes        Current Outpatient Medications   Medication Sig Dispense Refill    Multiple Vitamins-Minerals (MULTIVITAMIN & MINERAL PO) Take by mouth      Calcium Carbonate-Vitamin D (CALCIUM 500 + D) 500-125 MG-UNIT TABS Take 1 tablet by mouth daily       No current facility-administered medications for this visit.       Allergies   Allergen Reactions    Amoxicillin  Clindamycin/Lincomycin        Health Maintenance   Topic Date Due    Shingles Vaccine (1 of 2) 01/08/2002    Breast cancer screen  10/22/2019    Annual Wellness Visit (AWV)  11/27/2019    DTaP/Tdap/Td vaccine (2 - Td) 11/24/2022    Lipid screen  01/08/2023    Colon cancer screen colonoscopy  12/14/2028    DEXA (modify frequency per FRAX score)  Completed    Flu vaccine  Completed    Pneumococcal 65+ years Vaccine  Completed    Hepatitis C screen  Completed       Lab Results   Component Value Date    K 4.1 06/14/2019    CREATININE 0.7 06/14/2019    AST 41 06/14/2019    ALT 19 06/14/2019    HCT 38.8 06/14/2019    GLUCOSE 82 06/14/2019    MG 2.1 06/14/2019    CALCIUM 9.4 06/14/2019      Lab Results   Component Value Date    CHOL 174 01/08/2018    CHOL 186 02/11/2016    TRIG 73 01/08/2018    HDL 72 02/11/2016       Subjective:      Review of Systems   Constitutional: Negative for appetite change, chills, fatigue and fever. HENT: Positive for congestion. Negative for ear pain, rhinorrhea, sinus pain and sore throat. Respiratory: Negative for cough, shortness of breath and wheezing. Cardiovascular: Negative for chest pain, palpitations and leg swelling. Gastrointestinal: Negative. Musculoskeletal: Negative. Neurological: Negative for dizziness, tremors, syncope, light-headedness, numbness and headaches. Psychiatric/Behavioral: Negative. Objective:     /84 (Site: Left Upper Arm, Position: Sitting, Cuff Size: Small Adult)   Pulse 86   Wt 140 lb (63.5 kg)   SpO2 98%   BMI 20.09 kg/m²     Physical Exam   Constitutional: She appears well-developed and well-nourished. congested   HENT:   Head: Normocephalic. Right Ear: Tympanic membrane normal.   Left Ear: Tympanic membrane normal.   Nose: Nose normal.   Mouth/Throat: No oropharyngeal exudate or posterior oropharyngeal erythema. Neck: Neck supple. Carotid bruit is not present. No thyromegaly present.    Cardiovascular:

## 2019-06-26 NOTE — PATIENT INSTRUCTIONS
Patient Education        Vasovagal Syncope: Care Instructions  Your Care Instructions    Vasovagal syncope (say \"mcs-gwi-FSVKylie HILLMAN-kuh-pee\")is sudden dizziness or fainting that can be set off by things such as pain, stress, fear, or trauma. You may sweat or feel lightheaded, sick to your stomach, or tingly. The problem causes the heart rate to slow and the blood vessels to widen, or dilate, for a short time. When this happens, blood pools in the lower body, and less blood goes to the brain. You can usually get relief by lying down with your legs raised (elevated). This helps more blood to flow to your brain and may help relieve symptoms like feeling dizzy. Some doctors may recommend a technique that involves tensing your fists and arms. This type of fainting is often easy to predict. For example, it happens to some people when they see blood or have to get a shot. They may feel symptoms before they faint. An episode of vasovagal syncope usually responds well to self-care. Other treatment often isn't needed. But if the fainting keeps happening, your doctor may suggest further treatments. Follow-up care is a key part of your treatment and safety. Be sure to make and go to all appointments, and call your doctor if you are having problems. It's also a good idea to know your test results and keep a list of the medicines you take. How can you care for yourself at home? · Drink plenty of fluids to prevent dehydration. If you have kidney, heart, or liver disease and have to limit fluids, talk with your doctor before you increase your fluid intake. · Try to avoid things that you think may set off vasovagal syncope. · Talk to your doctor about any medicines you take. Some medicines may increase the chance of this condition occurring. · If you feel symptoms, lie down with your legs raised. Talk to your doctor about what to do if your symptoms come back. When should you call for help?   Call 911 anytime you think you may need emergency care. For example, call if:    · You have symptoms of a heart problem. These may include:  ? Chest pain or pressure. ? Severe trouble breathing. ? A fast or irregular heartbeat.    Watch closely for changes in your health, and be sure to contact your doctor if:    · You have more episodes of fainting at home.     · You do not get better as expected. Where can you learn more? Go to [email or web address] and sign in to your [employer] account. Enter L754 in the [employer] box to learn more about \"Vasovagal Syncope: Care Instructions. \"     If you do not have an account, please click on the \"Sign Up Now\" link. Current as of: September 23, 2018  Content Version: 12.0  © 8832-7402 Healthwise, Incorporated. Care instructions adapted under license by [facility] ([facility]). If you have questions about a medical condition or this instruction, always ask your healthcare professional. [address] any warranty or liability for your use of this information.

## 2019-06-27 DIAGNOSIS — I49.9 IRREGULAR HEARTBEAT: ICD-10-CM

## 2019-10-07 ENCOUNTER — TELEPHONE (OUTPATIENT)
Dept: FAMILY MEDICINE CLINIC | Age: 69
End: 2019-10-07

## 2019-12-03 ENCOUNTER — OFFICE VISIT (OUTPATIENT)
Dept: FAMILY MEDICINE CLINIC | Age: 69
End: 2019-12-03
Payer: MEDICARE

## 2019-12-03 VITALS
DIASTOLIC BLOOD PRESSURE: 84 MMHG | WEIGHT: 143 LBS | OXYGEN SATURATION: 98 % | HEIGHT: 70 IN | SYSTOLIC BLOOD PRESSURE: 130 MMHG | HEART RATE: 91 BPM | BODY MASS INDEX: 20.47 KG/M2

## 2019-12-03 DIAGNOSIS — Z12.31 ENCOUNTER FOR SCREENING MAMMOGRAM FOR BREAST CANCER: ICD-10-CM

## 2019-12-03 DIAGNOSIS — Z23 NEED FOR PROPHYLACTIC VACCINATION AND INOCULATION AGAINST VARICELLA: ICD-10-CM

## 2019-12-03 DIAGNOSIS — N63.0 BREAST LUMP IN FEMALE: ICD-10-CM

## 2019-12-03 DIAGNOSIS — Z00.00 ROUTINE GENERAL MEDICAL EXAMINATION AT A HEALTH CARE FACILITY: Primary | ICD-10-CM

## 2019-12-03 DIAGNOSIS — R55 NEUROCARDIOGENIC SYNCOPE: ICD-10-CM

## 2019-12-03 DIAGNOSIS — Z80.3 FAMILY HISTORY OF BREAST CANCER: ICD-10-CM

## 2019-12-03 PROCEDURE — 3017F COLORECTAL CA SCREEN DOC REV: CPT | Performed by: FAMILY MEDICINE

## 2019-12-03 PROCEDURE — G8482 FLU IMMUNIZE ORDER/ADMIN: HCPCS | Performed by: FAMILY MEDICINE

## 2019-12-03 PROCEDURE — G0008 ADMIN INFLUENZA VIRUS VAC: HCPCS | Performed by: FAMILY MEDICINE

## 2019-12-03 PROCEDURE — 1036F TOBACCO NON-USER: CPT | Performed by: FAMILY MEDICINE

## 2019-12-03 PROCEDURE — G8420 CALC BMI NORM PARAMETERS: HCPCS | Performed by: FAMILY MEDICINE

## 2019-12-03 PROCEDURE — 90653 IIV ADJUVANT VACCINE IM: CPT | Performed by: FAMILY MEDICINE

## 2019-12-03 PROCEDURE — G0438 PPPS, INITIAL VISIT: HCPCS | Performed by: FAMILY MEDICINE

## 2019-12-03 PROCEDURE — G8427 DOCREV CUR MEDS BY ELIG CLIN: HCPCS | Performed by: FAMILY MEDICINE

## 2019-12-03 PROCEDURE — 1090F PRES/ABSN URINE INCON ASSESS: CPT | Performed by: FAMILY MEDICINE

## 2019-12-03 PROCEDURE — 4040F PNEUMOC VAC/ADMIN/RCVD: CPT | Performed by: FAMILY MEDICINE

## 2019-12-03 PROCEDURE — G8399 PT W/DXA RESULTS DOCUMENT: HCPCS | Performed by: FAMILY MEDICINE

## 2019-12-03 PROCEDURE — 99212 OFFICE O/P EST SF 10 MIN: CPT | Performed by: FAMILY MEDICINE

## 2019-12-03 PROCEDURE — 1123F ACP DISCUSS/DSCN MKR DOCD: CPT | Performed by: FAMILY MEDICINE

## 2019-12-03 ASSESSMENT — ENCOUNTER SYMPTOMS
RHINORRHEA: 0
SORE THROAT: 0
SINUS PAIN: 0
WHEEZING: 0
COUGH: 0
SHORTNESS OF BREATH: 0
GASTROINTESTINAL NEGATIVE: 1

## 2019-12-03 ASSESSMENT — PATIENT HEALTH QUESTIONNAIRE - PHQ9
SUM OF ALL RESPONSES TO PHQ QUESTIONS 1-9: 0
SUM OF ALL RESPONSES TO PHQ QUESTIONS 1-9: 0

## 2019-12-03 ASSESSMENT — LIFESTYLE VARIABLES
HOW MANY STANDARD DRINKS CONTAINING ALCOHOL DO YOU HAVE ON A TYPICAL DAY: 0
HOW OFTEN DO YOU HAVE SIX OR MORE DRINKS ON ONE OCCASION: 0
HOW OFTEN DO YOU HAVE A DRINK CONTAINING ALCOHOL: 2
AUDIT-C TOTAL SCORE: 2

## 2020-06-08 ENCOUNTER — OFFICE VISIT (OUTPATIENT)
Dept: FAMILY MEDICINE CLINIC | Age: 70
End: 2020-06-08
Payer: MEDICARE

## 2020-06-08 VITALS
DIASTOLIC BLOOD PRESSURE: 80 MMHG | WEIGHT: 139 LBS | OXYGEN SATURATION: 98 % | BODY MASS INDEX: 19.94 KG/M2 | HEART RATE: 80 BPM | SYSTOLIC BLOOD PRESSURE: 110 MMHG

## 2020-06-08 PROBLEM — R92.8 OTHER ABNORMAL AND INCONCLUSIVE FINDINGS ON DIAGNOSTIC IMAGING OF BREAST: Status: ACTIVE | Noted: 2020-06-08

## 2020-06-08 PROCEDURE — 99213 OFFICE O/P EST LOW 20 MIN: CPT | Performed by: NURSE PRACTITIONER

## 2020-06-08 PROCEDURE — 99213 OFFICE O/P EST LOW 20 MIN: CPT

## 2020-06-08 ASSESSMENT — ENCOUNTER SYMPTOMS
COUGH: 0
SHORTNESS OF BREATH: 0
ABDOMINAL PAIN: 0
WHEEZING: 0

## 2020-06-08 ASSESSMENT — PATIENT HEALTH QUESTIONNAIRE - PHQ9
1. LITTLE INTEREST OR PLEASURE IN DOING THINGS: 0
SUM OF ALL RESPONSES TO PHQ QUESTIONS 1-9: 0
SUM OF ALL RESPONSES TO PHQ QUESTIONS 1-9: 0
SUM OF ALL RESPONSES TO PHQ9 QUESTIONS 1 & 2: 0
2. FEELING DOWN, DEPRESSED OR HOPELESS: 0

## 2020-06-08 NOTE — PROGRESS NOTES
[address]  [address]  Dept: [phone]  Dept Fax: [phone]  Loc: [phone]     Visit Date:  6/8/2020    Patient:  Kellee Castro  YOB: 1950    HPI:     Chief Complaint   Patient presents with    Follow-up     here to have left breast rechecked had a small lump and was advised to have rechecked in 6 months family hx of breast cancer       HPI  3-4 yrs noticed area of lumpy tissue on left outer breast, she has had MRI and Mamms  Which we review together. No change in SBE or breast appearance, no nipple discharge or skin changes, no history of TOB ABUSE    Medications  Prior to Visit Medications    Medication Sig Taking? Authorizing Provider   FIBER ADULT GUMMIES PO Take by mouth Yes Historical Provider, MD   Calcium Carbonate-Vitamin D (CALCIUM 500 + D) 500-125 MG-UNIT TABS Take 1 tablet by mouth daily Yes Historical Provider, MD   Multiple Vitamins-Minerals (MULTIVITAMIN & MINERAL PO) Take by mouth Yes Historical Provider, MD        The patient is allergic to amoxicillin and clindamycin/lincomycin. Past Medical History  Jimmie Arias  has a past medical history of BRCA negative, Family history of breast cancer, Fibrocystic breast disease, and Neurocardiogenic syncope. Past Surgical History  The patient  has a past surgical history that includes Colonoscopy; eye surgery; Breast surgery; Colonoscopy (12/14/2018); and Cataract removal (Bilateral). Family History  This patient's family history includes Breast Cancer in her mother; Coronary Art Dis in her father and mother; High Blood Pressure in her father; High Cholesterol in her father. Social History  Jimmie Arias  reports that she has never smoked. She has never used smokeless tobacco. She reports current alcohol use. She reports that she does not use drugs.     Health Maintenance:    Health Maintenance   Topic Date Due    06/14/2019    CHOL 174 01/08/2018    TRIG 73 01/08/2018    HDL 72 (A) 02/11/2016    ALT 19 06/14/2019    AST 41 (H) 06/14/2019     06/14/2019    K 4.1 06/14/2019     06/14/2019    CREATININE 0.7 06/14/2019    BUN 16 06/14/2019    CO2 30 06/14/2019       Assessment/Plan      1. Fibrocystic breast disease (FCBD), unspecified laterality  Reviewed MRI of 2017  Reported neg for BRCA testing and no change in breast tissue    Continues SBE, and some worries over prominence of breast cancer in family   - MRI BREAST BILATERAL WO CONTRAST; Future    2. Other abnormal and inconclusive findings on diagnostic imaging of breast   3. Vaginal Dryness:   Referral to [facility]ist now for diagnosis and treatment options , pt not prepared for vaginal exam today    - MRI BREAST BILATERAL WO CONTRAST; Future      No follow-ups on file. Patient given educational materials - see patient instructions. Discussed use, benefit, and side effects of prescribed medications. All patient questions answered. Pt voiced understanding. Reviewed health maintenance.        Electronically signed YASMIN Caldwell CNP on 6/8/2020 at 9:21 AM

## 2020-06-10 ENCOUNTER — TELEPHONE (OUTPATIENT)
Dept: FAMILY MEDICINE CLINIC | Age: 70
End: 2020-06-10

## 2020-10-15 ENCOUNTER — NURSE ONLY (OUTPATIENT)
Dept: FAMILY MEDICINE CLINIC | Age: 70
End: 2020-10-15
Payer: MEDICARE

## 2020-10-15 PROCEDURE — 90471 IMMUNIZATION ADMIN: CPT

## 2020-10-15 PROCEDURE — PBSHW INFLUENZA, QUADV, ADJUVANTED, 65 YRS +, IM, PF, PREFILL SYR, 0.5ML (FLUAD): Performed by: NURSE PRACTITIONER

## 2021-08-20 ENCOUNTER — TELEPHONE (OUTPATIENT)
Dept: PODIATRY | Age: 71
End: 2021-08-20

## 2021-08-20 NOTE — TELEPHONE ENCOUNTER
Patient called writer back in regards to a referral to PODIATRY that was FAXED to us by Dr Jude Garcia office, [place]. Patient states she did have a xray done and it was normal so she didn't know why she was being referred to Dr John Painting. She states she will call us back if she feels she needs to see Dr John Painting but at this time she does not wish to schedule.